# Patient Record
Sex: MALE | URBAN - NONMETROPOLITAN AREA
[De-identification: names, ages, dates, MRNs, and addresses within clinical notes are randomized per-mention and may not be internally consistent; named-entity substitution may affect disease eponyms.]

---

## 2018-03-15 PROBLEM — Z1283 SCREENING FOR MALIGNANT NEOPLASMS OF SKIN: Status: RESOLVED | Noted: 2017-12-14 | Resolved: 2018-03-15

## 2018-12-19 ENCOUNTER — APPOINTMENT (RX ONLY)
Dept: URBAN - NONMETROPOLITAN AREA CLINIC 6 | Facility: CLINIC | Age: 33
Setting detail: DERMATOLOGY
End: 2018-12-19

## 2018-12-19 DIAGNOSIS — D22 MELANOCYTIC NEVI: ICD-10-CM

## 2018-12-19 DIAGNOSIS — Z71.89 OTHER SPECIFIED COUNSELING: ICD-10-CM

## 2018-12-19 DIAGNOSIS — Z80.8 FAMILY HISTORY OF MALIGNANT NEOPLASM OF OTHER ORGANS OR SYSTEMS: ICD-10-CM

## 2018-12-19 PROBLEM — D22.5 MELANOCYTIC NEVI OF TRUNK: Status: ACTIVE | Noted: 2018-12-19

## 2018-12-19 PROBLEM — D23.5 OTHER BENIGN NEOPLASM OF SKIN OF TRUNK: Status: ACTIVE | Noted: 2018-12-19

## 2018-12-19 PROCEDURE — 99214 OFFICE O/P EST MOD 30 MIN: CPT

## 2018-12-19 PROCEDURE — ? COUNSELING

## 2018-12-19 PROCEDURE — ? OBSERVATION

## 2018-12-19 ASSESSMENT — LOCATION DETAILED DESCRIPTION DERM
LOCATION DETAILED: LEFT SUPERIOR MEDIAL LOWER BACK
LOCATION DETAILED: SUPRAPUBIC SKIN

## 2018-12-19 ASSESSMENT — LOCATION SIMPLE DESCRIPTION DERM
LOCATION SIMPLE: GROIN
LOCATION SIMPLE: LEFT LOWER BACK

## 2018-12-19 ASSESSMENT — LOCATION ZONE DERM: LOCATION ZONE: TRUNK

## 2018-12-19 NOTE — PROCEDURE: OBSERVATION
X Size Of Lesion In Cm (Optional): 0
Detail Level: Detailed
Size Of Lesion In Cm (Optional): 0.2
Size Of Lesion In Cm (Optional): 0.7
Size Of Lesion In Cm (Optional): 0.5

## 2020-12-16 ENCOUNTER — APPOINTMENT (RX ONLY)
Dept: URBAN - NONMETROPOLITAN AREA CLINIC 6 | Facility: CLINIC | Age: 35
Setting detail: DERMATOLOGY
End: 2020-12-16

## 2020-12-16 DIAGNOSIS — Z71.89 OTHER SPECIFIED COUNSELING: ICD-10-CM

## 2020-12-16 DIAGNOSIS — Z80.8 FAMILY HISTORY OF MALIGNANT NEOPLASM OF OTHER ORGANS OR SYSTEMS: ICD-10-CM

## 2020-12-16 DIAGNOSIS — D22 MELANOCYTIC NEVI: ICD-10-CM

## 2020-12-16 PROBLEM — D22.5 MELANOCYTIC NEVI OF TRUNK: Status: ACTIVE | Noted: 2020-12-16

## 2020-12-16 PROBLEM — D23.5 OTHER BENIGN NEOPLASM OF SKIN OF TRUNK: Status: ACTIVE | Noted: 2020-12-16

## 2020-12-16 PROCEDURE — ? OBSERVATION

## 2020-12-16 PROCEDURE — ? COUNSELING

## 2020-12-16 PROCEDURE — 99214 OFFICE O/P EST MOD 30 MIN: CPT

## 2020-12-16 ASSESSMENT — LOCATION DETAILED DESCRIPTION DERM
LOCATION DETAILED: LEFT SUPERIOR MEDIAL LOWER BACK
LOCATION DETAILED: SUPRAPUBIC SKIN

## 2020-12-16 ASSESSMENT — LOCATION SIMPLE DESCRIPTION DERM
LOCATION SIMPLE: GROIN
LOCATION SIMPLE: LEFT LOWER BACK

## 2020-12-16 ASSESSMENT — LOCATION ZONE DERM: LOCATION ZONE: TRUNK

## 2020-12-16 NOTE — PROCEDURE: OBSERVATION
Detail Level: Detailed
X Size Of Lesion In Cm (Optional): 0
Size Of Lesion In Cm (Optional): 0.7
Size Of Lesion In Cm (Optional): 0.2
Size Of Lesion In Cm (Optional): 0.5

## 2021-12-16 ENCOUNTER — APPOINTMENT (RX ONLY)
Dept: URBAN - NONMETROPOLITAN AREA CLINIC 6 | Facility: CLINIC | Age: 36
Setting detail: DERMATOLOGY
End: 2021-12-16

## 2021-12-16 DIAGNOSIS — Z80.8 FAMILY HISTORY OF MALIGNANT NEOPLASM OF OTHER ORGANS OR SYSTEMS: ICD-10-CM

## 2021-12-16 DIAGNOSIS — Z71.89 OTHER SPECIFIED COUNSELING: ICD-10-CM

## 2021-12-16 DIAGNOSIS — D22 MELANOCYTIC NEVI: ICD-10-CM

## 2021-12-16 PROBLEM — D22.5 MELANOCYTIC NEVI OF TRUNK: Status: ACTIVE | Noted: 2021-12-16

## 2021-12-16 PROBLEM — D23.5 OTHER BENIGN NEOPLASM OF SKIN OF TRUNK: Status: ACTIVE | Noted: 2021-12-16

## 2021-12-16 PROCEDURE — ? COUNSELING

## 2021-12-16 PROCEDURE — ? OBSERVATION

## 2021-12-16 PROCEDURE — 99213 OFFICE O/P EST LOW 20 MIN: CPT

## 2021-12-16 ASSESSMENT — LOCATION DETAILED DESCRIPTION DERM
LOCATION DETAILED: SUPRAPUBIC SKIN
LOCATION DETAILED: LEFT SUPERIOR MEDIAL LOWER BACK

## 2021-12-16 ASSESSMENT — LOCATION SIMPLE DESCRIPTION DERM
LOCATION SIMPLE: LEFT LOWER BACK
LOCATION SIMPLE: GROIN

## 2021-12-16 ASSESSMENT — LOCATION ZONE DERM: LOCATION ZONE: TRUNK

## 2021-12-16 NOTE — PROCEDURE: COUNSELING
Detail Level: Zone
Detail Level: Detailed
Sunscreen Recommendations: SPF 30-45, broad spectrum
Detail Level: Simple

## 2021-12-16 NOTE — PROCEDURE: OBSERVATION
X Size Of Lesion In Cm (Optional): 0
Detail Level: Detailed
Size Of Lesion In Cm (Optional): 0.5
Size Of Lesion In Cm (Optional): 0.7
Size Of Lesion In Cm (Optional): 0.2

## 2022-12-14 ENCOUNTER — APPOINTMENT (RX ONLY)
Dept: URBAN - NONMETROPOLITAN AREA CLINIC 6 | Facility: CLINIC | Age: 37
Setting detail: DERMATOLOGY
End: 2022-12-14

## 2022-12-14 DIAGNOSIS — Z80.8 FAMILY HISTORY OF MALIGNANT NEOPLASM OF OTHER ORGANS OR SYSTEMS: ICD-10-CM

## 2022-12-14 DIAGNOSIS — D22 MELANOCYTIC NEVI: ICD-10-CM

## 2022-12-14 DIAGNOSIS — Z71.89 OTHER SPECIFIED COUNSELING: ICD-10-CM

## 2022-12-14 PROBLEM — D22.5 MELANOCYTIC NEVI OF TRUNK: Status: ACTIVE | Noted: 2022-12-14

## 2022-12-14 PROBLEM — D23.5 OTHER BENIGN NEOPLASM OF SKIN OF TRUNK: Status: ACTIVE | Noted: 2022-12-14

## 2022-12-14 PROCEDURE — ? OBSERVATION

## 2022-12-14 PROCEDURE — ? COUNSELING

## 2022-12-14 PROCEDURE — 99213 OFFICE O/P EST LOW 20 MIN: CPT

## 2022-12-14 ASSESSMENT — LOCATION ZONE DERM: LOCATION ZONE: TRUNK

## 2022-12-14 ASSESSMENT — LOCATION DETAILED DESCRIPTION DERM
LOCATION DETAILED: LEFT SUPERIOR MEDIAL LOWER BACK
LOCATION DETAILED: SUPRAPUBIC SKIN

## 2022-12-14 ASSESSMENT — LOCATION SIMPLE DESCRIPTION DERM
LOCATION SIMPLE: LEFT LOWER BACK
LOCATION SIMPLE: GROIN

## 2022-12-14 NOTE — PROCEDURE: OBSERVATION
Detail Level: Detailed
X Size Of Lesion In Cm (Optional): 0
Size Of Lesion In Cm (Optional): 0.2
Size Of Lesion In Cm (Optional): 0.5
Size Of Lesion In Cm (Optional): 0.7

## 2023-12-20 ENCOUNTER — APPOINTMENT (RX ONLY)
Dept: URBAN - NONMETROPOLITAN AREA CLINIC 6 | Facility: CLINIC | Age: 38
Setting detail: DERMATOLOGY
End: 2023-12-20

## 2023-12-20 DIAGNOSIS — Z71.89 OTHER SPECIFIED COUNSELING: ICD-10-CM

## 2023-12-20 DIAGNOSIS — D22 MELANOCYTIC NEVI: ICD-10-CM

## 2023-12-20 DIAGNOSIS — Z80.8 FAMILY HISTORY OF MALIGNANT NEOPLASM OF OTHER ORGANS OR SYSTEMS: ICD-10-CM

## 2023-12-20 PROBLEM — D22.5 MELANOCYTIC NEVI OF TRUNK: Status: ACTIVE | Noted: 2023-12-20

## 2023-12-20 PROBLEM — D23.5 OTHER BENIGN NEOPLASM OF SKIN OF TRUNK: Status: ACTIVE | Noted: 2023-12-20

## 2023-12-20 PROCEDURE — 99213 OFFICE O/P EST LOW 20 MIN: CPT

## 2023-12-20 PROCEDURE — ? OBSERVATION

## 2023-12-20 PROCEDURE — ? COUNSELING

## 2023-12-20 ASSESSMENT — LOCATION ZONE DERM: LOCATION ZONE: TRUNK

## 2023-12-20 ASSESSMENT — LOCATION SIMPLE DESCRIPTION DERM
LOCATION SIMPLE: LEFT LOWER BACK
LOCATION SIMPLE: GROIN

## 2023-12-20 ASSESSMENT — LOCATION DETAILED DESCRIPTION DERM
LOCATION DETAILED: LEFT SUPERIOR MEDIAL LOWER BACK
LOCATION DETAILED: SUPRAPUBIC SKIN

## 2025-01-07 ENCOUNTER — APPOINTMENT (OUTPATIENT)
Dept: URBAN - NONMETROPOLITAN AREA CLINIC 5 | Facility: CLINIC | Age: 40
Setting detail: DERMATOLOGY
End: 2025-01-07

## 2025-01-07 DIAGNOSIS — D22 MELANOCYTIC NEVI: ICD-10-CM

## 2025-01-07 DIAGNOSIS — Z71.89 OTHER SPECIFIED COUNSELING: ICD-10-CM

## 2025-01-07 DIAGNOSIS — Z80.8 FAMILY HISTORY OF MALIGNANT NEOPLASM OF OTHER ORGANS OR SYSTEMS: ICD-10-CM

## 2025-01-07 PROBLEM — C44.319 BASAL CELL CARCINOMA OF SKIN OF OTHER PARTS OF FACE: Status: ACTIVE | Noted: 2025-01-07

## 2025-01-07 PROBLEM — D22.5 MELANOCYTIC NEVI OF TRUNK: Status: ACTIVE | Noted: 2025-01-07

## 2025-01-07 PROBLEM — D23.5 OTHER BENIGN NEOPLASM OF SKIN OF TRUNK: Status: ACTIVE | Noted: 2025-01-07

## 2025-01-07 PROCEDURE — 11102 TANGNTL BX SKIN SINGLE LES: CPT

## 2025-01-07 PROCEDURE — ? BIOPSY BY SHAVE METHOD

## 2025-01-07 PROCEDURE — ? OBSERVATION

## 2025-01-07 PROCEDURE — ? COUNSELING

## 2025-01-07 PROCEDURE — 99213 OFFICE O/P EST LOW 20 MIN: CPT | Mod: 25

## 2025-01-07 ASSESSMENT — LOCATION ZONE DERM: LOCATION ZONE: TRUNK

## 2025-01-07 ASSESSMENT — LOCATION SIMPLE DESCRIPTION DERM
LOCATION SIMPLE: GROIN
LOCATION SIMPLE: LEFT LOWER BACK
LOCATION SIMPLE: CHEST

## 2025-01-07 ASSESSMENT — LOCATION DETAILED DESCRIPTION DERM
LOCATION DETAILED: SUPRAPUBIC SKIN
LOCATION DETAILED: RIGHT MEDIAL INFERIOR CHEST
LOCATION DETAILED: LEFT SUPERIOR MEDIAL LOWER BACK

## 2025-01-07 NOTE — PROCEDURE: BIOPSY BY SHAVE METHOD
Detail Level: Detailed
Depth Of Biopsy: dermis
Was A Bandage Applied: Yes
Size Of Lesion In Cm: 0.3
X Size Of Lesion In Cm: 0
Biopsy Type: H and E
Biopsy Method: Personna blade
Anesthesia Type: 1% lidocaine with epinephrine and a 1:10 solution of 8.4% sodium bicarbonate
Anesthesia Volume In Cc: 1
Hemostasis: Battery Operated Radiofrequency
Wound Care: Petrolatum
Dressing: pressure dressing with telfa
Destruction After The Procedure: No
Type Of Destruction Used: Curettage
Curettage Text: The wound bed was treated with curettage after the biopsy was performed.
Cryotherapy Text: The wound bed was treated with cryotherapy after the biopsy was performed.
Electrodesiccation Text: The wound bed was treated with electrodesiccation after the biopsy was performed.
Electrodesiccation And Curettage Text: The wound bed was treated with electrodesiccation and curettage after the biopsy was performed.
Silver Nitrate Text: The wound bed was treated with silver nitrate after the biopsy was performed.
Lab: [id number]
Lab Facility: [id number]
Path Notes (To The Dermatopathologist): Please check margins
Medical Necessity Information: It is in your best interest to select a reason for this procedure from the list below. All of these items fulfill various CMS LCD requirements except the new and changing color options.
Consent: Written consent was obtained and risks were reviewed including but not limited to scarring, infection, bleeding, scabbing, incomplete removal, nerve damage and allergy to anesthesia.
Post-Care Instructions: I reviewed with the patient in detail post-care instructions. Patient is to keep the biopsy site dry overnight, and then apply Vaseline petroleum jelly twice daily until healed.
Notification Instructions: Patient will be notified of biopsy results. However, patient instructed to call the office if not contacted within 2 weeks.
Billing Type: Third-Party Bill
Information: Selecting Yes will display possible errors in your note based on the variables you have selected. This validation is only offered as a suggestion for you. PLEASE NOTE THAT THE VALIDATION TEXT WILL BE REMOVED WHEN YOU FINALIZE YOUR NOTE. IF YOU WANT TO FAX A PRELIMINARY NOTE YOU WILL NEED TO TOGGLE THIS TO 'NO' IF YOU DO NOT WANT IT IN YOUR FAXED NOTE.

## 2025-01-07 NOTE — PROCEDURE: OBSERVATION
Detail Level: Detailed
X Size Of Lesion In Cm (Optional): 0
Size Of Lesion In Cm (Optional): 0.2
Size Of Lesion In Cm (Optional): 0.5
Size Of Lesion In Cm (Optional): 0.7
Size Of Lesion In Cm (Optional): 0.4